# Patient Record
Sex: MALE | Employment: FULL TIME | URBAN - METROPOLITAN AREA
[De-identification: names, ages, dates, MRNs, and addresses within clinical notes are randomized per-mention and may not be internally consistent; named-entity substitution may affect disease eponyms.]

---

## 2017-08-02 DIAGNOSIS — N50.819 TESTICULAR PAIN: Primary | ICD-10-CM

## 2018-04-27 ENCOUNTER — HOSPITAL ENCOUNTER (EMERGENCY)
Facility: CLINIC | Age: 23
Discharge: HOME | End: 2018-04-27
Attending: EMERGENCY MEDICINE | Admitting: EMERGENCY MEDICINE
Payer: COMMERCIAL

## 2018-04-27 ENCOUNTER — APPOINTMENT (OUTPATIENT)
Dept: CT IMAGING | Facility: CLINIC | Age: 23
End: 2018-04-27
Attending: EMERGENCY MEDICINE
Payer: COMMERCIAL

## 2018-04-27 VITALS
HEIGHT: 69 IN | RESPIRATION RATE: 16 BRPM | TEMPERATURE: 98.8 F | DIASTOLIC BLOOD PRESSURE: 90 MMHG | WEIGHT: 170 LBS | SYSTOLIC BLOOD PRESSURE: 142 MMHG | OXYGEN SATURATION: 100 % | BODY MASS INDEX: 25.18 KG/M2

## 2018-04-27 DIAGNOSIS — N23 RENAL COLIC: Primary | ICD-10-CM

## 2018-04-27 DIAGNOSIS — R10.9 FLANK PAIN: ICD-10-CM

## 2018-04-27 DIAGNOSIS — Z87.438 HISTORY OF HYDROCELE: ICD-10-CM

## 2018-04-27 LAB
ALBUMIN SERPL-MCNC: 4.6 G/DL (ref 3.4–5)
ALBUMIN UR-MCNC: NEGATIVE MG/DL
ALP SERPL-CCNC: 72 U/L (ref 40–150)
ALT SERPL W P-5'-P-CCNC: 21 U/L (ref 0–70)
ANION GAP SERPL CALCULATED.3IONS-SCNC: 8 MMOL/L (ref 3–14)
APPEARANCE UR: CLEAR
AST SERPL W P-5'-P-CCNC: 16 U/L (ref 0–45)
BASOPHILS # BLD AUTO: 0 10E9/L (ref 0–0.2)
BASOPHILS NFR BLD AUTO: 0.6 %
BILIRUB SERPL-MCNC: 0.6 MG/DL (ref 0.2–1.3)
BILIRUB UR QL STRIP: NEGATIVE
BUN SERPL-MCNC: 13 MG/DL (ref 7–30)
CALCIUM SERPL-MCNC: 9.3 MG/DL (ref 8.5–10.1)
CHLORIDE SERPL-SCNC: 104 MMOL/L (ref 94–109)
CO2 SERPL-SCNC: 27 MMOL/L (ref 20–32)
COLOR UR AUTO: ABNORMAL
CREAT SERPL-MCNC: 0.94 MG/DL (ref 0.66–1.25)
DIFFERENTIAL METHOD BLD: NORMAL
EOSINOPHIL # BLD AUTO: 0.1 10E9/L (ref 0–0.7)
EOSINOPHIL NFR BLD AUTO: 1.1 %
ERYTHROCYTE [DISTWIDTH] IN BLOOD BY AUTOMATED COUNT: 11.7 % (ref 10–15)
GFR SERPL CREATININE-BSD FRML MDRD: >90 ML/MIN/1.7M2
GLUCOSE SERPL-MCNC: 117 MG/DL (ref 70–99)
GLUCOSE UR STRIP-MCNC: NEGATIVE MG/DL
HCT VFR BLD AUTO: 44 % (ref 40–53)
HGB BLD-MCNC: 15.9 G/DL (ref 13.3–17.7)
HGB UR QL STRIP: NEGATIVE
IMM GRANULOCYTES # BLD: 0 10E9/L (ref 0–0.4)
IMM GRANULOCYTES NFR BLD: 0.2 %
KETONES UR STRIP-MCNC: NEGATIVE MG/DL
LEUKOCYTE ESTERASE UR QL STRIP: NEGATIVE
LYMPHOCYTES # BLD AUTO: 1.2 10E9/L (ref 0.8–5.3)
LYMPHOCYTES NFR BLD AUTO: 18.5 %
MCH RBC QN AUTO: 31.7 PG (ref 26.5–33)
MCHC RBC AUTO-ENTMCNC: 36.1 G/DL (ref 31.5–36.5)
MCV RBC AUTO: 88 FL (ref 78–100)
MONOCYTES # BLD AUTO: 0.3 10E9/L (ref 0–1.3)
MONOCYTES NFR BLD AUTO: 4.9 %
MUCOUS THREADS #/AREA URNS LPF: PRESENT /LPF
NEUTROPHILS # BLD AUTO: 4.8 10E9/L (ref 1.6–8.3)
NEUTROPHILS NFR BLD AUTO: 74.7 %
NITRATE UR QL: NEGATIVE
NRBC # BLD AUTO: 0 10*3/UL
NRBC BLD AUTO-RTO: 0 /100
PH UR STRIP: 6.5 PH (ref 5–7)
PLATELET # BLD AUTO: 235 10E9/L (ref 150–450)
POTASSIUM SERPL-SCNC: 3.8 MMOL/L (ref 3.4–5.3)
PROT SERPL-MCNC: 8.1 G/DL (ref 6.8–8.8)
RBC # BLD AUTO: 5.01 10E12/L (ref 4.4–5.9)
RBC #/AREA URNS AUTO: <1 /HPF (ref 0–2)
SODIUM SERPL-SCNC: 139 MMOL/L (ref 133–144)
SOURCE: ABNORMAL
SP GR UR STRIP: 1.01 (ref 1–1.03)
UROBILINOGEN UR STRIP-MCNC: NORMAL MG/DL (ref 0–2)
WBC # BLD AUTO: 6.4 10E9/L (ref 4–11)
WBC #/AREA URNS AUTO: 0 /HPF (ref 0–5)

## 2018-04-27 PROCEDURE — 80053 COMPREHEN METABOLIC PANEL: CPT | Performed by: EMERGENCY MEDICINE

## 2018-04-27 PROCEDURE — 85025 COMPLETE CBC W/AUTO DIFF WBC: CPT | Performed by: EMERGENCY MEDICINE

## 2018-04-27 PROCEDURE — 99284 EMERGENCY DEPT VISIT MOD MDM: CPT | Mod: 25

## 2018-04-27 PROCEDURE — 81001 URINALYSIS AUTO W/SCOPE: CPT | Performed by: EMERGENCY MEDICINE

## 2018-04-27 PROCEDURE — 74176 CT ABD & PELVIS W/O CONTRAST: CPT

## 2018-04-27 ASSESSMENT — ENCOUNTER SYMPTOMS
HEMATURIA: 1
FEVER: 0

## 2018-04-27 NOTE — ED AVS SNAPSHOT
Emergency Department    [address] [place] [address]    Phone:  [phone]    Fax:  [phone]                                       aBkari Hayes   MRN: [id number]    Department:   Emergency Department   Date of Visit:  4/27/2018           After Visit Summary Signature Page     I have received my discharge instructions, and my questions have been answered. I have discussed any challenges I see with this plan with the nurse or doctor.    ..........................................................................................................................................  Patient/Patient Representative Signature      ..........................................................................................................................................  Patient Representative Print Name and Relationship to Patient    ..................................................               ................................................  Date                                            Time    ..........................................................................................................................................  Reviewed by Signature/Title    ...................................................              ..............................................  Date                                                            Time

## 2018-04-27 NOTE — ED PROVIDER NOTES
"  History     Chief Complaint:  Pelvic Pain    HPI   Bakari Hayes is a 22 year old male with a history of hydroceles who presents to the emergency department today for evaluation of pelvic pain. The patient reports he had been having back pain in the mornings over the past week. Over the past few days, he began having pelvic pain. Today, he had sharp razor like pains in his penis. He went to the bathroom and noticed blood in his urine. He believes he passed a kidney stone and after this, his pain resolved. After this, he presented to . After , he had another episode of sharp pain and right testicular discomfort, prompting him to present to the emergency department. He denies fever.     Allergies:  No Known Drug Allergies    Medications:    The patient is currently on no regular medications.    Past Medical History:    No pertinent past medical history.     Past Surgical History:    No pertinent past surgical history.     Family History:    History reviewed. No pertinent family history.     Social History:  The patient was accompanied to the ED by his girlfriend.  Marital Status:  Single      Review of Systems   Constitutional: Negative for fever.   Genitourinary: Positive for hematuria.   Musculoskeletal:        Flank pain, right   All other systems reviewed and are negative.    Physical Exam   /90  Pulse (P) 91  Temp 98.8  F (37.1  C) (Axillary)  Resp 16  Ht 1.753 m (5' 9\")  Wt 77.1 kg (170 lb)  SpO2 100%  BMI 25.1 kg/m2    Physical Exam  General: Alert, appears well-developed and well-nourished. Cooperative.     In no acute distress  HEENT:  Head:  Atraumatic  Ears:  External ears are normal  Mouth/Throat:  Oropharynx is without erythema or exudate and mucous membranes are moist.   Eyes:   Conjunctivae normal and EOM are normal. No scleral icterus.  CV:  Normal rate, regular rhythm, normal heart sounds and radial pulses are 2+ and symmetric.      No murmur.  Resp:  Breath sounds are clear " bilaterally    Non-labored, no retractions or accessory muscle use  GI:  Abdomen is soft, no distension, no tenderness. No rebound or guarding. No CVA tenderness bilaterally.   :   Circumcised, no penile discharge, right hydrocele with no testicular tenderness bilaterally. No detected hernias. Scrotum non-tender. No tenderness to bilateral epididymis   MS:  Normal range of motion. No edema.    Normal strength in all 4 extremities.     Back atraumatic.    No midline cervical, thoracic, or lumbar tenderness  Skin:  Warm and dry.  No rash or lesions noted.  Neuro: Alert. Normal strength.  GCS: 15  Psych:  Normal mood and affect.  Lymph: No inguinal lymphadenopathy.     Emergency Department Course     Imaging:  Radiology findings were communicated with the patient who voiced understanding of the findings.    Abdominal/Pelvis CT w/o Contrast:  IMPRESSION: No acute process demonstrated.  Report per radiology     Laboratory:  Laboratory findings were communicated with the patient who voiced understanding of the findings.  CBC: WBC 6.4, HGB 15.9,   CMP: Creatinine 0.94, glucose 117 (H)  UA with Micro: Mucous Present    Emergency Department Course:  Nursing notes and vitals reviewed.  I performed an exam of the patient as documented above.   The patient was sent for a Abdominal/Pelvis CT w/o Contrast: while in the emergency department, results above.   updated on the imaging and laboratory results.   IV was inserted and blood was drawn for laboratory testing, results above.    1730 Patient rechecked and updated.     I personally reviewed the laboratory and imaging results with the Patient and answered all related questions prior to discharge.  Findings and plan explained to the Patient. Patient discharged home with instructions regarding supportive care, medications, and reasons to return. The importance of close follow-up was reviewed.     Impression & Plan    Medical Decision Making:  The patient presented with  unilateral flank and dysuria pain consistent with renal colic. CT shows no acute kidney stone and no sinister intra-abdominal processes. CT was obtained as patient had repeat episode of pain after a suspected passing earlier this afternoon.  Concern there were multiple stones.  Pt did agree to CT even with the understanding that he may have passed the one and only stone.  Patient's pain is likely residual from recently passing suspected stone.  Pain is controlled, Patient did not want any pain control here in the ED.     There is no fever or evidence of a urinary tract infection. I suspect the stone passed as there are almost no RBCs on microscopy on urinalysis. The patient will be discharged with Ibuprofen for pain. Other etiologies for these symptoms (AAA, pyelonephritis, amongst others) are considered and have been excluded.  Patient can follow up with primary care as needed.  There is no evidence of additional renal stones on CT scan today.  Patient did have a  exam and no concern for testicular torsion, epididymitis, or inguinal hernia.  He does have a known right hydrocele which has no change in size according to patient. Today's visit consistent with likely passed kidney stone.  No other acute process detected on lab or imagine evaluation today.    Critical Care time:  none    Diagnosis:      1. Renal colic   2. Flank pain   3. History of hydrocele     Disposition:  discharged to home    Scribe Disclosure:  I, Nde Reinoso, am serving as a scribe at 5:08 PM on 4/27/2018 to document services personally performed by Ciro March MD based on my observations and the provider's statements to me.     4/27/2018    EMERGENCY DEPARTMENT       Ciro March MD  04/27/18 1917       Ciro March MD  04/27/18 1918

## 2018-04-27 NOTE — ED AVS SNAPSHOT
Emergency Department    [address] [facility] [address]    Phone:  [phone]    Fax:  [phone]                                       Bakari Hayes   MRN: [id number]    Department:   Emergency Department   Date of Visit:  4/27/2018           Patient Information     Date Of Birth          1995        Your diagnoses for this visit were:     Renal colic     Flank pain     History of hydrocele        You were seen by Ciro March MD.      Follow-up Information     Schedule an appointment as soon as possible for a visit with Your urologist.    Why:  As needed        Follow up with  Emergency Department.    Specialty:  EMERGENCY MEDICINE    Why:  If symptoms worsen    Contact information:    [address] [facility] [address] [phone]        Discharge Instructions       Discharge Instructions  Kidney Stones    Kidney stones are a common problem that can cause a lot of pain but fortunately are usually not dangerous. Kidney stones form in the kidney and then can cause a blockage (obstruction) of the flow of urine from the kidney which leads to pain. Most patients can manage kidney stones at home (without a hospital stay).  However, sometimes your condition may be worse than it seemed at first, or may get worse with time. Most kidney stones will pass on their own, but occasionally stones may need to be removed by an urologist.    Generally, every Emergency Department visit should have a follow-up clinic visit with either a primary or a specialty clinic/provider. Please follow-up as instructed by your emergency provider today.      Return to the Emergency Department if:    Your pain is not controlled despite the medications provided or recommended.    You are vomiting (throwing up) and cannot keep fluids or medications down.    You develop a fever (>100.4 F).    You feel much more ill or develop new symptoms.  What can I do to help myself?    Be sure to drink  plenty of fluids.    If instructed to do so, strain your urine (pee) with the urine strainer you were provided with today. Your stone may look like a grain of sand or a small pebble. Collect any stones in the cup provided and bring to your follow-up appointment.    Staying active is good, and may help the stone to pass. You may do whatever you feel up to doing without restrictions.   Treatment:    Non-steroidal anti-inflammatory drugs (NSAIDs). This includes prescription medicines like Toradol  (ketorolac) and non-prescription medicines like Advil  (ibuprofen) and Nuprin  (ibuprofen) and Naproxen. These pain relievers are very effective for kidney stones.    Nausea (sick to your stomach) medication.  Nausea and vomiting are common with kidney stones, so your provider may send you home with medicine for this.     Flomax  (tamsulosin). This medicine is sometimes used for men with prostate problems, but also can help kidney stones to pass. Its effectiveness is controversial or questionable so it is prescribed in certain situations. This medicine can lower blood pressure, and you may feel faint/lightheaded, especially when you first stand up. Be sure to get up gradually, sit down if you feel faint, and avoid activity where feeling faint would be dangerous, such as climbing ladders.  If you were given a prescription for medicine here today, be sure to read all of the information (including the package insert) that comes with your prescription.  This will include important information about the medicine, its side effects, and any warnings that you need to know about.  The pharmacist who fills the prescription can provide more information and answer questions you may have about the medicine.  If you have questions or concerns that the pharmacist cannot address, please call or return to the Emergency Department.   Remember that you can always come back to the Emergency Department if you are not able to see your regular  provider in the amount of time listed above, if you get any new symptoms, or if there is anything that worries you.      24 Hour Appointment Hotline       To make an appointment at any [facility], call [phone] ([phone]). If you don't have a family doctor or clinic, we will help you find one. [facility] clinics are conveniently located to serve the needs of you and your family.             Review of your medicines      Our records show that you are taking the medicines listed below. If these are incorrect, please call your family doctor or clinic.        Dose / Directions Last dose taken    OMEPRAZOLE PO        Refills:  0                Procedures and tests performed during your visit     Abd/pelvis CT no contrast - Stone Protocol    CBC with platelets differential    Comprehensive metabolic panel    UA with Microscopic      Orders Needing Specimen Collection     None      Pending Results     Date and Time Order Name Status Description    4/27/2018 1716 Abd/pelvis CT no contrast - Stone Protocol Preliminary             Pending Culture Results     No orders found from 4/25/2018 to 4/28/2018.            Pending Results Instructions     If you had any lab results that were not finalized at the time of your Discharge, you can call the ED Lab Result RN at [phone]. You will be contacted by this team for any positive Lab results or changes in treatment. The nurses are available 7 days a week from 10A to 6:30P.  You can leave a message 24 hours per day and they will return your call.        Test Results From Your Hospital Stay        4/27/2018  5:38 PM      Component Results     Component Value Ref Range & Units Status    WBC 6.4 4.0 - 11.0 10e9/L Final    RBC Count 5.01 4.4 - 5.9 10e12/L Final    Hemoglobin 15.9 13.3 - 17.7 g/dL Final    Hematocrit 44.0 40.0 - 53.0 % Final    MCV 88 78 - 100 fl Final    MCH 31.7 26.5 - 33.0 pg Final    MCHC 36.1 31.5 - 36.5 g/dL Final    RDW 11.7 10.0 - 15.0 % Final     Platelet Count 235 150 - 450 10e9/L Final    Diff Method Automated Method  Final    % Neutrophils 74.7 % Final    % Lymphocytes 18.5 % Final    % Monocytes 4.9 % Final    % Eosinophils 1.1 % Final    % Basophils 0.6 % Final    % Immature Granulocytes 0.2 % Final    Nucleated RBCs 0 0 /100 Final    Absolute Neutrophil 4.8 1.6 - 8.3 10e9/L Final    Absolute Lymphocytes 1.2 0.8 - 5.3 10e9/L Final    Absolute Monocytes 0.3 0.0 - 1.3 10e9/L Final    Absolute Eosinophils 0.1 0.0 - 0.7 10e9/L Final    Absolute Basophils 0.0 0.0 - 0.2 10e9/L Final    Abs Immature Granulocytes 0.0 0 - 0.4 10e9/L Final    Absolute Nucleated RBC 0.0  Final         4/27/2018  5:59 PM      Component Results     Component Value Ref Range & Units Status    Sodium 139 133 - 144 mmol/L Final    Potassium 3.8 3.4 - 5.3 mmol/L Final    Chloride 104 94 - 109 mmol/L Final    Carbon Dioxide 27 20 - 32 mmol/L Final    Anion Gap 8 3 - 14 mmol/L Final    Glucose 117 (H) 70 - 99 mg/dL Final    Urea Nitrogen 13 7 - 30 mg/dL Final    Creatinine 0.94 0.66 - 1.25 mg/dL Final    GFR Estimate >90 >60 mL/min/1.7m2 Final    Non  GFR Calc    GFR Estimate If Black >90 >60 mL/min/1.7m2 Final    African American GFR Calc    Calcium 9.3 8.5 - 10.1 mg/dL Final    Bilirubin Total 0.6 0.2 - 1.3 mg/dL Final    Albumin 4.6 3.4 - 5.0 g/dL Final    Protein Total 8.1 6.8 - 8.8 g/dL Final    Alkaline Phosphatase 72 40 - 150 U/L Final    ALT 21 0 - 70 U/L Final    AST 16 0 - 45 U/L Final         4/27/2018  5:44 PM      Narrative     CT ABDOMEN AND PELVIS WITHOUT CONTRAST 4/27/2018 5:38 PM     HISTORY: Right flank pain earlier today with colicky episodes  throughout the day.  No history of kidney stones, but felt extreme  pain with urinating today.    COMPARISON: None.    TECHNIQUE: Axial CT images of the abdomen and pelvis from the  diaphragm to the symphysis pubis were acquired without IV contrast.  Radiation dose for this scan was reduced using automated  exposure  control, adjustment of the mA and/or kV according to patient size, or  iterative reconstruction technique.    FINDINGS: There are no stones seen within either kidney, either  ureter, or the bladder. There is no hydroureter or hydronephrosis.  There is no perinephric fat stranding. Kidneys are normal in size and  configuration. Unremarkable appendix. No diverticulitis. Normal  caliber aorta. Unremarkable contracted gallbladder. Liver  unremarkable. No splenomegaly.  No definite adrenal nodules. Pancreas  grossly unremarkable. The remainder of the visualized abdomen is  unremarkable on this noncontrast scan. Survey of the visualized bony  structures demonstrates no destructive bony lesions.  The visualized  lung bases are unremarkable.        Impression     IMPRESSION: No acute process demonstrated.         4/27/2018  5:41 PM      Component Results     Component Value Ref Range & Units Status    Color Urine Straw  Final    Appearance Urine Clear  Final    Glucose Urine Negative NEG^Negative mg/dL Final    Bilirubin Urine Negative NEG^Negative Final    Ketones Urine Negative NEG^Negative mg/dL Final    Specific Gravity Urine 1.007 1.003 - 1.035 Final    Blood Urine Negative NEG^Negative Final    pH Urine 6.5 5.0 - 7.0 pH Final    Protein Albumin Urine Negative NEG^Negative mg/dL Final    Urobilinogen mg/dL Normal 0.0 - 2.0 mg/dL Final    Nitrite Urine Negative NEG^Negative Final    Leukocyte Esterase Urine Negative NEG^Negative Final    Source Midstream Urine  Final    WBC Urine 0 0 - 5 /HPF Final    RBC Urine <1 0 - 2 /HPF Final    Mucous Urine Present (A) NEG^Negative /LPF Final                Clinical Quality Measure: Blood Pressure Screening     Your blood pressure was checked while you were in the emergency department today. The last reading we obtained was  BP: (!) 146/97 . Please read the guidelines below about what these numbers mean and what you should do about them.  If your systolic blood pressure  "(the top number) is less than 120 and your diastolic blood pressure (the bottom number) is less than 80, then your blood pressure is normal. There is nothing more that you need to do about it.  If your systolic blood pressure (the top number) is 120-139 or your diastolic blood pressure (the bottom number) is 80-89, your blood pressure may be higher than it should be. You should have your blood pressure rechecked within a year by a primary care provider.  If your systolic blood pressure (the top number) is 140 or greater or your diastolic blood pressure (the bottom number) is 90 or greater, you may have high blood pressure. High blood pressure is treatable, but if left untreated over time it can put you at risk for heart attack, stroke, or kidney failure. You should have your blood pressure rechecked by a primary care provider within the next 4 weeks.  If your provider in the emergency department today gave you specific instructions to follow-up with your doctor or provider even sooner than that, you should follow that instruction and not wait for up to 4 weeks for your follow-up visit.        Thank you for choosing [facility]       Thank you for choosing [facility] for your care. Our goal is always to provide you with excellent care. Hearing back from our patients is one way we can continue to improve our services. Please take a few minutes to complete the written survey that you may receive in the mail after you visit with us. Thank you!        [email or web address] Information     [facility] lets you send messages to your doctor, view your test results, renew your prescriptions, schedule appointments and more. To sign up, go to [email or web address] . Click on \"Log in\" on the left side of the screen, which will take you to the Welcome page. Then click on \"Sign up Now\" on the right side of the page.     You will be asked to enter the access code listed below, as well as some personal information. Please follow the directions to " create your username and password.     Your access code is: [email or web address]  Expires: 2018  6:03 PM     Your access code will  in 90 days. If you need help or a new code, please call your [facility] clinic or [phone].        Care EveryWhere ID     This is your Care EveryWhere ID. This could be used by other organizations to access your [facility] medical records  [id number]        Equal Access to Services     [facility] : Hadii amy yang hadasho Soomaali, waaxda luqadaha, qaybta kaalmada adeegyada, bob sainz . So [facility] [phone].    ATENCIÓN: Si habla español, tiene a mares disposición servicios gratuitos de asistencia lingüística. Llame al [phone].    We comply with applicable federal civil rights laws and [place] laws. We do not discriminate on the basis of race, color, national origin, age, disability, sex, sexual orientation, or gender identity.            After Visit Summary       This is your record. Keep this with you and show to your community pharmacist(s) and doctor(s) at your next visit.

## 2020-01-15 ENCOUNTER — OFFICE VISIT (OUTPATIENT)
Dept: FAMILY MEDICINE | Facility: CLINIC | Age: 25
End: 2020-01-15
Payer: COMMERCIAL

## 2020-01-15 VITALS
OXYGEN SATURATION: 100 % | BODY MASS INDEX: 25.62 KG/M2 | HEART RATE: 75 BPM | TEMPERATURE: 97.5 F | WEIGHT: 173 LBS | HEIGHT: 69 IN | DIASTOLIC BLOOD PRESSURE: 80 MMHG | SYSTOLIC BLOOD PRESSURE: 110 MMHG

## 2020-01-15 DIAGNOSIS — R06.2 WHEEZING: ICD-10-CM

## 2020-01-15 DIAGNOSIS — J20.9 ACUTE BRONCHITIS WITH SYMPTOMS > 10 DAYS: Primary | ICD-10-CM

## 2020-01-15 PROCEDURE — 99203 OFFICE O/P NEW LOW 30 MIN: CPT | Performed by: NURSE PRACTITIONER

## 2020-01-15 RX ORDER — AZITHROMYCIN 250 MG/1
TABLET, FILM COATED ORAL
Qty: 6 TABLET | Refills: 0 | Status: SHIPPED | OUTPATIENT
Start: 2020-01-15 | End: 2020-01-20

## 2020-01-15 RX ORDER — FLUTICASONE PROPIONATE 50 MCG
SPRAY, SUSPENSION (ML) NASAL
COMMUNITY

## 2020-01-15 ASSESSMENT — MIFFLIN-ST. JEOR: SCORE: 1755.1

## 2020-01-15 NOTE — PROGRESS NOTES
Subjective     Bakari Hayes is a 24 year old male who presents to clinic today for the following health issues:      Acute Illness   Acute illness concerns: sinus congestion   Onset: on and off since late august - was seen at [facility] clinic in Nov - dx with sinus infection. Was px doxycycline, felt somewhat better      Fever: no    Chills/Sweats: no    Headache (location?): YES    Sinus Pressure:YES    Conjunctivitis:  no    Ear Pain: YES: right    Rhinorrhea: YES    Congestion: YES    Sore Throat: YES- slight      Cough: YES    Wheeze: YES    Decreased Appetite: no    Nausea: no    Vomiting: no    Diarrhea:  no    Dysuria/Freq.: no    Fatigue/Achiness: YES    Sick/Strep Exposure: YES- dad had influenza a couple wks ago      Therapies Tried and outcome: mucinex, albuterol and flonase      HPI: Bakari presents today with the complaints of ongoing cough, wheeze, and sinus congestion. He did have a documented sinusitis last Fall that was reportedly successfully treated with doxycycline. However, his symptoms returned shortly after treatment and have waxed and wanted since that time. No fever, chills, or body. Had mild asthma as a child. Non-smoker. No shortness of breath. Has tried OTC remedies without much success.     There is no problem list on file for this patient.    Past Surgical History:   Procedure Laterality Date     ANKLE SURGERY      2 surgeries on R ankle       Social History     Tobacco Use     Smoking status: Never Smoker     Smokeless tobacco: Never Used   Substance Use Topics     Alcohol use: Yes     History reviewed. No pertinent family history.        Reviewed and updated as needed this visit by Provider  Tobacco  Allergies  Meds  Problems  Med Hx  Surg Hx  Fam Hx         Review of Systems   ROS COMP: Constitutional, HEENT, pulmonary systems are negative, except as otherwise noted.      Objective    /80 (BP Location: Left arm, Patient Position: Chair, Cuff Size: Adult Regular)    "Pulse 75   Temp 97.5  F (36.4  C) (Tympanic)   Ht 1.753 m (5' 9\")   Wt 78.5 kg (173 lb)   SpO2 100%   BMI 25.55 kg/m    Body mass index is 25.55 kg/m .  Physical Exam   GENERAL: Healthy, alert and no distress  HENT: ear canals and TM's normal, nose and mouth without ulcers or lesions  NECK: no adenopathy, no asymmetry, masses, or scars and thyroid normal to palpation  RESP: Lungs clear to auscultation - no rales, rhonchi or wheezes; No findings to suggest focal / lobar consolidation; Chest excursion, respiratory rate, and ventilatory effort / ease within normal limits. No indicators of respiratory distress.  CV: regular rate and rhythm, normal S1 S2, no S3 or S4, no murmur, click or rub, no peripheral edema and peripheral pulses strong  NEURO: Normal strength and tone, mentation intact and speech normal    Diagnostic Test Results:  none         Assessment & Plan     Bakari was seen today for nasal congestion.    Diagnoses and all orders for this visit:    Acute bronchitis with symptoms > 10 days  Comment: Bakari does have GERD, a history of reactive airway, and notable allergies, which likely set up him for protracted cough after URI. I will treat him with azithromycin given duration of symptoms and set him up with an aggressive sinus decongestion regimen (Heat, Flonase, Decongestant, Anti-histamine) and suggest continuing omeprazole for GERD for now. He will follow up with me in a week if no better. Discussed reasons to call or return to clinic. Bakari acknowledges and demonstrates understanding of circumstances under which care should be sought urgently or emergently. Follow up as discussed. Discussed risks, benefits, alternatives, potential side effects, and proper administration of new medication / treatment. Agrees with plan of care. All questions answered.   -     azithromycin (ZITHROMAX) 250 MG tablet; Take 2 tablets (500 mg) by mouth daily for 1 day, THEN 1 tablet (250 mg) daily for 4 " "days.    Wheezing  Comment: Will order albuterol for periodic wheezing. He is invited to return for spirometry / PFTs if he so desires (if he believes asthma is once again a possibility).   -     albuterol (PROAIR RESPICLICK) 108 (90 Base) MCG/ACT inhaler; Inhale 2 puffs into the lungs every 6 hours as needed for shortness of breath / dyspnea or wheezing         BMI:   Estimated body mass index is 25.55 kg/m  as calculated from the following:    Height as of this encounter: 1.753 m (5' 9\").    Weight as of this encounter: 78.5 kg (173 lb).       See Patient Instructions    Return in about 1 week (around 1/22/2020) for persistent or worsening symptoms.    Agapito Turcios NP  [facility] [place]      "

## 2022-12-14 NOTE — PATIENT INSTRUCTIONS
1. Heat  2. Mucinex  3. Zyrtec-D or Allegra-D  4. Flonase    5. Azithromycin     Detail Level: Simple Detail Level: Generalized Detail Level: Zone Cleanser Recommendations: Gentle cleasners including Cerave Gentle Cleanser, Cetaphil Gentle Cleanser, Unscented Dove Bar Moisturizer Recommendations: Cerave cream\\nCetaphil Cream\\[place] products should be color free and fragrance free Pramoxine 1% Recommendations: Sarna lotion is a branded product that has pramoxine but there are many generic products available. you may apply this product as many times per day as you need to  manage itching Antihistamine Recommendations: Daily allegra or Zyrtec or Claritin (generic equivalent is ok). May take Benadryl in the evening or hydroxizine (if prescribed) to manage nightime itching. some antihistamines cause drowsiness and should be used with caution Detail Level: Detailed